# Patient Record
Sex: FEMALE | Race: WHITE
[De-identification: names, ages, dates, MRNs, and addresses within clinical notes are randomized per-mention and may not be internally consistent; named-entity substitution may affect disease eponyms.]

---

## 2017-08-12 ENCOUNTER — HEALTH MAINTENANCE LETTER (OUTPATIENT)
Age: 62
End: 2017-08-12

## 2018-08-19 ENCOUNTER — HEALTH MAINTENANCE LETTER (OUTPATIENT)
Age: 63
End: 2018-08-19

## 2019-04-01 RX ORDER — DICLOFENAC SODIUM 10 MG/G
GEL TOPICAL
COMMUNITY

## 2019-04-04 ENCOUNTER — OFFICE VISIT (OUTPATIENT)
Dept: ORTHOPEDICS | Facility: CLINIC | Age: 64
End: 2019-04-04
Payer: COMMERCIAL

## 2019-04-04 DIAGNOSIS — M20.12 HALLUX VALGUS OF LEFT FOOT: ICD-10-CM

## 2019-04-04 DIAGNOSIS — M21.612 BUNION OF GREAT TOE OF LEFT FOOT: Primary | ICD-10-CM

## 2019-04-04 PROCEDURE — 20600 SMALL JOINT ASPIRATION/INJECTION: L GREAT MTP: ICD-10-PCS | Mod: FA,S$GLB,, | Performed by: ORTHOPAEDIC SURGERY

## 2019-04-04 PROCEDURE — 99213 PR OFFICE/OUTPT VISIT, EST, LEVL III, 20-29 MIN: ICD-10-PCS | Mod: 25,S$GLB,, | Performed by: ORTHOPAEDIC SURGERY

## 2019-04-04 PROCEDURE — 20600 DRAIN/INJ JOINT/BURSA W/O US: CPT | Mod: FA,S$GLB,, | Performed by: ORTHOPAEDIC SURGERY

## 2019-04-04 PROCEDURE — 99213 OFFICE O/P EST LOW 20 MIN: CPT | Mod: 25,S$GLB,, | Performed by: ORTHOPAEDIC SURGERY

## 2019-04-04 RX ORDER — CEPHALEXIN 500 MG/1
CAPSULE ORAL
COMMUNITY
Start: 2019-04-03

## 2019-04-04 RX ORDER — LOSARTAN POTASSIUM 50 MG/1
TABLET ORAL
COMMUNITY
Start: 2019-01-09

## 2019-04-04 RX ORDER — CARVEDILOL 25 MG/1
TABLET ORAL
COMMUNITY
Start: 2019-02-21

## 2019-04-04 RX ORDER — AMLODIPINE BESYLATE 5 MG/1
TABLET ORAL
COMMUNITY
Start: 2019-03-20

## 2019-04-04 NOTE — PROCEDURES
Small Joint Aspiration/Injection: L great MTP  Date/Time: 4/4/2019 10:20 AM  Performed by: Canelo Bates MD  Authorized by: Canelo Bates MD     Consent Done?:  Yes (Verbal)  Indications:  Pain and joint swelling    Location:  Great toe  Site:  L great MTP  Prep: Patient was prepped and draped in usual sterile fashion    Needle size:  27 G  Approach:  Medial  Medications:  10 mg triamcinolone acetonide 10 mg/mL  Patient tolerance:  Patient tolerated the procedure well with no immediate complications

## 2019-04-04 NOTE — PROGRESS NOTES
Subjective:      Patient ID: Kanchan Luna is a 63 y.o. female.    Chief Complaint: Pain and Swelling of the Left Foot    HPI this is a 63-year-old lady with painful bunion of her left foot. She gets good long-term relief from prior injections of the MTP joint and overlying bursa  Review of Systems   Constitution: Negative for fever and weight loss.   Cardiovascular: Negative for chest pain and leg swelling.   Musculoskeletal: Positive for joint pain, joint swelling and stiffness. Negative for arthritis and muscle weakness.   Gastrointestinal: Negative for change in bowel habit.   Genitourinary: Negative for bladder incontinence and hematuria.   Neurological: Negative for focal weakness, numbness, paresthesias and sensory change.         Objective:                    Left Ankle/Foot Exam     Comments:  Examination of the left foot shows active and passive range of motion of the 1st MTP joint is normal.  She is tender with palpation of the overlying bursa over the end of the 1st metatarsal.    She is neurovascularly intact    She has normal strength                Assessment:       Encounter Diagnoses   Name Primary?    Bunion of great toe of left foot Yes    Hallux valgus of left foot           Plan:       Kanchan was seen today for pain and swelling.    Diagnoses and all orders for this visit:    Bunion of great toe of left foot    Hallux valgus of left foot

## 2019-11-06 ENCOUNTER — HEALTH MAINTENANCE LETTER (OUTPATIENT)
Age: 64
End: 2019-11-06

## 2020-02-16 ENCOUNTER — HEALTH MAINTENANCE LETTER (OUTPATIENT)
Age: 65
End: 2020-02-16

## 2020-06-08 ENCOUNTER — TELEPHONE (OUTPATIENT)
Dept: ORTHOPEDICS | Facility: CLINIC | Age: 65
End: 2020-06-08

## 2020-06-08 NOTE — TELEPHONE ENCOUNTER
----- Message from Teena Back sent at 6/8/2020  1:37 PM CDT -----  Contact: pt   Pt calling regarding a surgery she was supposed to have she have a few questions about it please give her a call. [phone]       Thanks   Teena Back

## 2020-11-29 ENCOUNTER — HEALTH MAINTENANCE LETTER (OUTPATIENT)
Age: 65
End: 2020-11-29

## 2021-04-10 ENCOUNTER — HEALTH MAINTENANCE LETTER (OUTPATIENT)
Age: 66
End: 2021-04-10

## 2021-05-26 ENCOUNTER — RECORDS - HEALTHEAST (OUTPATIENT)
Dept: ADMINISTRATIVE | Facility: CLINIC | Age: 66
End: 2021-05-26

## 2021-09-19 ENCOUNTER — HEALTH MAINTENANCE LETTER (OUTPATIENT)
Age: 66
End: 2021-09-19

## 2022-01-09 ENCOUNTER — HEALTH MAINTENANCE LETTER (OUTPATIENT)
Age: 67
End: 2022-01-09

## 2022-05-01 ENCOUNTER — HEALTH MAINTENANCE LETTER (OUTPATIENT)
Age: 67
End: 2022-05-01

## 2022-11-21 ENCOUNTER — HEALTH MAINTENANCE LETTER (OUTPATIENT)
Age: 67
End: 2022-11-21

## 2023-04-16 ENCOUNTER — HEALTH MAINTENANCE LETTER (OUTPATIENT)
Age: 68
End: 2023-04-16

## 2023-06-02 ENCOUNTER — HEALTH MAINTENANCE LETTER (OUTPATIENT)
Age: 68
End: 2023-06-02